# Patient Record
Sex: MALE | Race: WHITE | NOT HISPANIC OR LATINO | URBAN - METROPOLITAN AREA
[De-identification: names, ages, dates, MRNs, and addresses within clinical notes are randomized per-mention and may not be internally consistent; named-entity substitution may affect disease eponyms.]

---

## 2023-10-13 ENCOUNTER — PHARMACY VISIT (OUTPATIENT)
Dept: PHARMACY | Facility: CLINIC | Age: 48
End: 2023-10-13
Payer: MEDICAID

## 2023-10-13 DIAGNOSIS — J30.2 SEASONAL ALLERGIES: Primary | ICD-10-CM

## 2023-10-13 PROCEDURE — RXMED WILLOW AMBULATORY MEDICATION CHARGE

## 2023-10-16 ENCOUNTER — PHARMACY VISIT (OUTPATIENT)
Dept: PHARMACY | Facility: CLINIC | Age: 48
End: 2023-10-16
Payer: MEDICAID

## 2023-10-16 PROCEDURE — RXMED WILLOW AMBULATORY MEDICATION CHARGE

## 2023-10-16 RX ORDER — PSEUDOEPHEDRINE HYDROCHLORIDE 60 MG/1
60 TABLET ORAL 3 TIMES DAILY PRN
Qty: 50 TABLET | Refills: 3 | Status: SHIPPED | OUTPATIENT
Start: 2023-10-16 | End: 2024-04-30 | Stop reason: SDUPTHER

## 2023-11-14 ENCOUNTER — PHARMACY VISIT (OUTPATIENT)
Dept: PHARMACY | Facility: CLINIC | Age: 48
End: 2023-11-14
Payer: MEDICAID

## 2023-11-14 DIAGNOSIS — E55.9 VITAMIN D DEFICIENCY: ICD-10-CM

## 2023-11-14 DIAGNOSIS — F32.A DEPRESSION, UNSPECIFIED DEPRESSION TYPE: ICD-10-CM

## 2023-11-14 DIAGNOSIS — B20 HIV INFECTION, UNSPECIFIED SYMPTOM STATUS (MULTI): ICD-10-CM

## 2023-11-14 DIAGNOSIS — E78.2 MIXED HYPERLIPIDEMIA: ICD-10-CM

## 2023-11-14 PROCEDURE — RXMED WILLOW AMBULATORY MEDICATION CHARGE

## 2023-11-16 ENCOUNTER — PHARMACY VISIT (OUTPATIENT)
Dept: PHARMACY | Facility: CLINIC | Age: 48
End: 2023-11-16
Payer: MEDICAID

## 2023-11-16 PROCEDURE — RXMED WILLOW AMBULATORY MEDICATION CHARGE

## 2023-11-16 RX ORDER — CHOLECALCIFEROL (VITAMIN D3) 50 MCG
50 TABLET ORAL DAILY
Qty: 30 TABLET | Refills: 5 | Status: SHIPPED | OUTPATIENT
Start: 2023-11-16 | End: 2024-05-26 | Stop reason: SDUPTHER

## 2023-11-16 RX ORDER — ATORVASTATIN CALCIUM 20 MG/1
20 TABLET, FILM COATED ORAL DAILY
Qty: 30 TABLET | Refills: 5 | Status: SHIPPED | OUTPATIENT
Start: 2023-11-16 | End: 2024-05-26 | Stop reason: SDUPTHER

## 2023-11-16 RX ORDER — CITALOPRAM 40 MG/1
40 TABLET, FILM COATED ORAL DAILY
Qty: 30 TABLET | Refills: 5 | Status: SHIPPED | OUTPATIENT
Start: 2023-11-16 | End: 2024-05-26 | Stop reason: SDUPTHER

## 2023-11-16 RX ORDER — DARUNAVIR, COBICISTAT, EMTRICITABINE, AND TENOFOVIR ALAFENAMIDE 800; 150; 200; 10 MG/1; MG/1; MG/1; MG/1
1 TABLET, FILM COATED ORAL DAILY
Qty: 30 TABLET | Refills: 5 | Status: SHIPPED | OUTPATIENT
Start: 2023-11-16 | End: 2024-05-26 | Stop reason: SDUPTHER

## 2023-12-15 PROCEDURE — RXMED WILLOW AMBULATORY MEDICATION CHARGE

## 2023-12-19 ENCOUNTER — PHARMACY VISIT (OUTPATIENT)
Dept: PHARMACY | Facility: CLINIC | Age: 48
End: 2023-12-19
Payer: MEDICAID

## 2024-01-24 PROCEDURE — RXMED WILLOW AMBULATORY MEDICATION CHARGE

## 2024-01-26 ENCOUNTER — PHARMACY VISIT (OUTPATIENT)
Dept: PHARMACY | Facility: CLINIC | Age: 49
End: 2024-01-26
Payer: MEDICAID

## 2024-01-29 ENCOUNTER — PHARMACY VISIT (OUTPATIENT)
Dept: PHARMACY | Facility: CLINIC | Age: 49
End: 2024-01-29

## 2024-02-28 ENCOUNTER — PHARMACY VISIT (OUTPATIENT)
Dept: PHARMACY | Facility: CLINIC | Age: 49
End: 2024-02-28
Payer: MEDICAID

## 2024-02-28 PROCEDURE — RXMED WILLOW AMBULATORY MEDICATION CHARGE

## 2024-03-14 ENCOUNTER — OFFICE VISIT (OUTPATIENT)
Dept: IMMUNOLOGY | Facility: CLINIC | Age: 49
End: 2024-03-14
Payer: COMMERCIAL

## 2024-03-14 VITALS
HEART RATE: 92 BPM | DIASTOLIC BLOOD PRESSURE: 79 MMHG | SYSTOLIC BLOOD PRESSURE: 140 MMHG | OXYGEN SATURATION: 97 % | BODY MASS INDEX: 31.38 KG/M2 | WEIGHT: 225 LBS

## 2024-03-14 DIAGNOSIS — I10 HYPERTENSION, UNSPECIFIED TYPE: ICD-10-CM

## 2024-03-14 DIAGNOSIS — Z13.9 SCREENING DUE: ICD-10-CM

## 2024-03-14 DIAGNOSIS — Z23 NEED FOR VACCINATION: ICD-10-CM

## 2024-03-14 DIAGNOSIS — B20 HIV INFECTION, UNSPECIFIED SYMPTOM STATUS (MULTI): Primary | ICD-10-CM

## 2024-03-14 LAB
ALBUMIN SERPL BCP-MCNC: 4.3 G/DL (ref 3.4–5)
ALP SERPL-CCNC: 54 U/L (ref 33–120)
ALT SERPL W P-5'-P-CCNC: 21 U/L (ref 10–52)
ANION GAP SERPL CALC-SCNC: 14 MMOL/L (ref 10–20)
AST SERPL W P-5'-P-CCNC: 23 U/L (ref 9–39)
BASOPHILS # BLD AUTO: 0.09 X10*3/UL (ref 0–0.1)
BASOPHILS NFR BLD AUTO: 1.4 %
BILIRUB SERPL-MCNC: 0.6 MG/DL (ref 0–1.2)
BUN SERPL-MCNC: 17 MG/DL (ref 6–23)
CALCIUM SERPL-MCNC: 9.5 MG/DL (ref 8.6–10.6)
CHLORIDE SERPL-SCNC: 103 MMOL/L (ref 98–107)
CO2 SERPL-SCNC: 28 MMOL/L (ref 21–32)
CREAT SERPL-MCNC: 1.09 MG/DL (ref 0.5–1.3)
EGFRCR SERPLBLD CKD-EPI 2021: 84 ML/MIN/1.73M*2
EOSINOPHIL # BLD AUTO: 0.35 X10*3/UL (ref 0–0.7)
EOSINOPHIL NFR BLD AUTO: 5.4 %
ERYTHROCYTE [DISTWIDTH] IN BLOOD BY AUTOMATED COUNT: 13 % (ref 11.5–14.5)
GLUCOSE SERPL-MCNC: 59 MG/DL (ref 74–99)
HCT VFR BLD AUTO: 46 % (ref 41–52)
HGB BLD-MCNC: 14.7 G/DL (ref 13.5–17.5)
IMM GRANULOCYTES # BLD AUTO: 0.01 X10*3/UL (ref 0–0.7)
IMM GRANULOCYTES NFR BLD AUTO: 0.2 % (ref 0–0.9)
LYMPHOCYTES # BLD AUTO: 1.1 X10*3/UL (ref 1.2–4.8)
LYMPHOCYTES NFR BLD AUTO: 17.1 %
MCH RBC QN AUTO: 31.5 PG (ref 26–34)
MCHC RBC AUTO-ENTMCNC: 32 G/DL (ref 32–36)
MCV RBC AUTO: 99 FL (ref 80–100)
MONOCYTES # BLD AUTO: 0.89 X10*3/UL (ref 0.1–1)
MONOCYTES NFR BLD AUTO: 13.8 %
NEUTROPHILS # BLD AUTO: 3.99 X10*3/UL (ref 1.2–7.7)
NEUTROPHILS NFR BLD AUTO: 62.1 %
NRBC BLD-RTO: 0 /100 WBCS (ref 0–0)
PLATELET # BLD AUTO: 445 X10*3/UL (ref 150–450)
POTASSIUM SERPL-SCNC: 4.5 MMOL/L (ref 3.5–5.3)
PROT SERPL-MCNC: 7.3 G/DL (ref 6.4–8.2)
RBC # BLD AUTO: 4.67 X10*6/UL (ref 4.5–5.9)
SODIUM SERPL-SCNC: 140 MMOL/L (ref 136–145)
WBC # BLD AUTO: 6.4 X10*3/UL (ref 4.4–11.3)

## 2024-03-14 PROCEDURE — 3078F DIAST BP <80 MM HG: CPT | Performed by: INTERNAL MEDICINE

## 2024-03-14 PROCEDURE — 36415 COLL VENOUS BLD VENIPUNCTURE: CPT | Performed by: INTERNAL MEDICINE

## 2024-03-14 PROCEDURE — 90471 IMMUNIZATION ADMIN: CPT | Performed by: INTERNAL MEDICINE

## 2024-03-14 PROCEDURE — 87536 HIV-1 QUANT&REVRSE TRNSCRPJ: CPT | Performed by: INTERNAL MEDICINE

## 2024-03-14 PROCEDURE — 85025 COMPLETE CBC W/AUTO DIFF WBC: CPT | Performed by: INTERNAL MEDICINE

## 2024-03-14 PROCEDURE — 99214 OFFICE O/P EST MOD 30 MIN: CPT | Performed by: INTERNAL MEDICINE

## 2024-03-14 PROCEDURE — 88185 FLOWCYTOMETRY/TC ADD-ON: CPT | Mod: TC | Performed by: INTERNAL MEDICINE

## 2024-03-14 PROCEDURE — 4274F FLU IMMUNO ADMIND RCVD: CPT | Performed by: INTERNAL MEDICINE

## 2024-03-14 PROCEDURE — 3077F SYST BP >= 140 MM HG: CPT | Performed by: INTERNAL MEDICINE

## 2024-03-14 PROCEDURE — 80053 COMPREHEN METABOLIC PANEL: CPT | Performed by: INTERNAL MEDICINE

## 2024-03-14 ASSESSMENT — ENCOUNTER SYMPTOMS
ACTIVITY CHANGE: 0
ANAL BLEEDING: 0
DYSURIA: 0
FATIGUE: 0
FEVER: 0
COUGH: 0
WOUND: 0
HEADACHES: 0
RECTAL PAIN: 0
DIARRHEA: 0
PALPITATIONS: 0
VOMITING: 0
NAUSEA: 0
CHILLS: 0
CONSTIPATION: 0
ABDOMINAL DISTENTION: 0
CHOKING: 0
JOINT SWELLING: 0
RHINORRHEA: 0
CHEST TIGHTNESS: 0
BACK PAIN: 0
DIZZINESS: 0
APPETITE CHANGE: 0
BLOOD IN STOOL: 0
SORE THROAT: 0
SINUS PAIN: 0
ABDOMINAL PAIN: 0
HEMATURIA: 0
SINUS PRESSURE: 0
FLANK PAIN: 0

## 2024-03-14 NOTE — PROGRESS NOTES
HIV Clinic Follow-up Visit:    Royce Miller Jr. was last seen in [facility] on Visit date not found    Missed antiretroviral doses in last 72 hours? No    Sexually active? no      Tobacco use: No     SUBJECTIVE:     Here for follow-up.  On Symtuza with report of a few missed doses this month. He has been missing doses at the beginning of each month since he has been late to call to request shipment of his prescription. Currently not sexually active.   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 Has some frustrations but overall work is ok.   ROS negative for any rhinorrhea, sore throat or cough. No chest pain. Reports dyspnea on moderate exertion like running to catch the bus, not new for him and not worsening.  Denies any abdominal pain, N/V. No melena, hematochezia or hematuria. No dysuria.   No headaches or dizziness.  Needs replacements for his eyeglasses, has been straining to see well. Upcoming appointment is in April. Does not drive.      Review of Systems  Review of Systems   Constitutional:  Negative for activity change, appetite change, chills, fatigue and fever.   HENT:  Negative for congestion, dental problem, ear pain, mouth sores, rhinorrhea, sinus pressure, sinus pain, sneezing and sore throat.    Respiratory:  Negative for cough, choking and chest tightness.    Cardiovascular:  Negative for chest pain, palpitations and leg swelling.   Gastrointestinal:  Negative for abdominal distention, abdominal pain, anal bleeding, blood in stool, constipation, diarrhea, nausea, rectal pain and vomiting.   Genitourinary:  Negative for dysuria, flank pain, hematuria, penile discharge and penile pain.   Musculoskeletal:  Negative for back pain and joint swelling.   Skin:  Negative for rash and wound.   Neurological:  Negative for dizziness, syncope and headaches.       CURRENT MEDICATIONS:    Current Outpatient Medications:     atorvastatin (Lipitor) 20 mg tablet, TAKE 1 TABLET BY MOUTH EVERY DAY, Disp: 30 tablet, Rfl:  5    cholecalciferol (Vitamin D-3) 50 MCG (2000 UT) tablet, TAKE 1 TABLET BY MOUTH ONCE DAILY, Disp: 30 tablet, Rfl: 5    citalopram (CeleXA) 40 mg tablet, TAKE ONE TABLET BY MOUTH EVERY DAY, Disp: 30 tablet, Rfl: 5    darunavir-bette-emtri-tenof ala (Symtuza) [id number] mg tablet, TAKE 1 TABLET BY MOUTH ONCE DAILY WITH FOOD, Disp: 30 tablet, Rfl: 5    ibuprofen 400 mg tablet, TAKE 1 TABLET BY MOUTH THREE TIMES A DAY AS NEEDED, Disp: 90 tablet, Rfl: 3    pseudoephedrine (Sudogest) 60 mg tablet, TAKE 1 TABLET BY MOUTH THREE TIMES A DAY AS NEEDED, Disp: 50 tablet, Rfl: 3    PHYSICAL EXAMINATION:  Visit Vitals  /79 (BP Location: Left arm, Patient Position: Sitting, BP Cuff Size: Adult)   Pulse 92   Wt 102 kg (225 lb)   SpO2 97%   BMI 31.38 kg/m²   BSA 2.26 m²       Physical Exam     General: In NAD. Conversant and pleasant  HEENT: Atraumatic, normocephalic, no sinus tenderness, moist mucous membranes, gingival inflammation over lower mandible with plaque noted, no oropharyngeal erythema or lesions. No palpable lymphadenopathy  Heart: S1S2, RRR, no murmur heard  Lungs: GBAE, clear to auscultation   Abdomen:+BS, soft, non-tender, no guarding or rebound   PVS:+peripheral pulses, no LE edema or erythema       PERTINENT DATA:  CBC:  WBC   Date Value Ref Range Status   02/02/2023 9.4 4.4 - 11.3 x10E9/L Final     nRBC   Date Value Ref Range Status   02/02/2023 0.0 0.0 - 0.0 /100 WBC Final     RBC   Date Value Ref Range Status   02/02/2023 4.64 4.50 - 5.90 x10E12/L Final     Hemoglobin   Date Value Ref Range Status   02/02/2023 12.9 (L) 13.5 - 17.5 g/dL Final     MCV   Date Value Ref Range Status   02/02/2023 89 80 - 100 fL Final     RDW   Date Value Ref Range Status   02/02/2023 15.5 (H) 11.5 - 14.5 % Final       Renal Function Panel:  Glucose   Date Value Ref Range Status   02/02/2023 75 74 - 99 mg/dL Final     Sodium   Date Value Ref Range Status   02/02/2023 136 136 - 145 mmol/L Final     Potassium   Date Value  "Ref Range Status   02/02/2023 4.5 3.5 - 5.3 mmol/L Final     Chloride   Date Value Ref Range Status   02/02/2023 98 98 - 107 mmol/L Final     Anion Gap   Date Value Ref Range Status   02/02/2023 12 10 - 20 mmol/L Final     Urea Nitrogen   Date Value Ref Range Status   02/02/2023 17 6 - 23 mg/dL Final     Creatinine   Date Value Ref Range Status   02/02/2023 1.12 0.50 - 1.30 mg/dL Final     Calcium   Date Value Ref Range Status   02/02/2023 9.7 8.6 - 10.6 mg/dL Final     Phosphorus   Date Value Ref Range Status   02/22/2021 3.1 2.5 - 4.9 mg/dL Final     Comment:      The performance characteristics of phosphorus testing in   heparinized plasma have been validated by the individual     laboratory site where testing is performed. Testing    on heparinized plasma is not approved by the FDA;    however, such approval is not necessary.       Albumin   Date Value Ref Range Status   02/02/2023 4.6 3.4 - 5.0 g/dL Final       Hepatic Panel:  Albumin   Date Value Ref Range Status   02/02/2023 4.6 3.4 - 5.0 g/dL Final     Total Bilirubin   Date Value Ref Range Status   02/02/2023 0.8 0.0 - 1.2 mg/dL Final     Bilirubin, Direct   Date Value Ref Range Status   10/19/2020 0.1 0.0 - 0.3 mg/dL Final     Alkaline Phosphatase   Date Value Ref Range Status   02/02/2023 60 33 - 120 U/L Final     ALT (SGPT)   Date Value Ref Range Status   02/02/2023 15 10 - 52 U/L Final     Comment:      Patients treated with Sulfasalazine may generate    falsely decreased results for ALT.         HIV Viral Load:  No results found for: \"[id number]\", \"HIVRNAPCR\"    CD4 Count:  CD3+CD4+%   Date Value Ref Range Status   02/02/2023 50 29 - 57 % Final     CD3+CD4+ Absolute   Date Value Ref Range Status   02/02/2023 0.640 0.350 - 2.740 x10E9/L Final     CD3+CD8+%   Date Value Ref Range Status   02/02/2023 37 (H) 7 - 31 % Final     CD3+CD8+ Absolute   Date Value Ref Range Status   02/02/2023 0.474 0.080 - 1.490 x10E9/L Final     CD4/CD8 Ratio   Date Value Ref " "Range Status   02/02/2023 1.35 1.00 - 3.50 Final     CD45%   Date Value Ref Range Status   02/02/2023 100 % Final       CRCL:  No results found for: \"URINEVOLUME\", \"CREATU\", \"[id number]\", \"CRCLEARANCE\"    Lipid Panel:  HDL   Date Value Ref Range Status   02/02/2023 73.6 mg/dL Final     Comment:     .      AGE      VERY LOW   LOW     NORMAL    HIGH       0-19 Y       < 35   < 40     40-45     ----    20-24 Y       ----   < 40       >45     ----      >24 Y       ----   < 40     40-60      >60  .       Cholesterol/HDL Ratio   Date Value Ref Range Status   02/02/2023 1.9  Final     Comment:     REF VALUES  DESIRABLE  < 3.4  HIGH RISK  > 5.0       LDL   Date Value Ref Range Status   02/02/2023 40 0 - 99 mg/dL Final     Comment:     .                           NEAR      BORD      AGE      DESIRABLE  OPTIMAL    HIGH     HIGH     VERY HIGH     0-19 Y     0 - 109     ---    110-129   >/= 130     ----    20-24 Y     0 - 119     ---    120-159   >/= 160     ----      >24 Y     0 -  99   100-129  130-159   160-189     >/=190  .       VLDL   Date Value Ref Range Status   02/02/2023 26 0 - 40 mg/dL Final     Triglycerides   Date Value Ref Range Status   02/02/2023 131 0 - 149 mg/dL Final     Comment:     .      AGE      DESIRABLE   BORDERLINE HIGH   HIGH     VERY HIGH   0 D-90 D    19 - 174         ----         ----        ----  91 D- 9 Y     0 -  74        75 -  99     >/= 100      ----    10-19 Y     0 -  89        90 - 129     >/= 130      ----    20-24 Y     0 - 114       115 - 149     >/= 150      ----         >24 Y     0 - 149       150 - 199    200- 499    >/= 500  .   Venipuncture immediately after or during the    administration of Metamizole may lead to falsely   low results. Testing should be performed immediately   prior to Metamizole dosing.         HgbA1c:  No results found for: \"HGBA1C\"    The ASCVD Risk score (Elise DK, et al., 2019) failed to calculate for the following reasons:    The smoking status is " invalid    ASSESSMENT / PLAN:    #HIV    -Well controlled  -Most recent CD4 count of 640 and VL undetectable on 23  -On Symtuza with occasional missed doses at the beginning of every month since he forgets to call in to have the medication shipped to his place.  -Discussed setting a reminder a week ahead of time to avoid missing doses and the importance of not missing any doses. He agreed that it sounded reasonable and he will try that.  -Currently not sexually active   -Will get routine labs today (CBC, CMP, CD4, VL)    #Anemia  Iron deficiency anemia  On PO Iron   Due for screening colonoscopy. Will order     #Depression  -On Citalopram   -Mood is stable.   No suicidal ideations.  Discussed mental health services here. He mentions it doesn't work with his schedule. Informed of capability of doing it over  the phone if needed.    #Obesity   Gained around 20 lbs since last visit.  Has not been exercising since his job has been physically demanding on him   Has been eating at home mostly.  Discussed importance of weight control and physical activity, also as an adjunct to help control BP    #HTN   BP in clinic today 140/79  Discussed measuring BP at home and noting it, so we can review at next visit.  Will prescribe BP cuff     #Health maintenance:    -Flu shot today, Otherwise UTD on  immunizations  -No need for STI screening today since currently not sexually active   -Most recent lipid panel 23: CT:140, HDL:73.6, LDL:40, T. On Atorvastatin 20mg.  Not fasting today. Will repeat lipid panel on next visit   -Age 48. Due for screening colonoscopy. Will order  -Physical exam with evidence of gingival inflammation over lower mandible. Has not had any recent dental follow-up. Discussed flossing, mouthwash and need for regular dental follow-ups. Will give him [facility] contact numbers to schedule appointment. He also has a dental clinic near his residence that he can also go to.     RTC 6 months                  Problem List Items Addressed This Visit    None      Nayan Higgins MD

## 2024-03-15 LAB
CD3+CD4+ CELLS # BLD: 0.55 X10E9/L
CD3+CD4+ CELLS # BLD: 550 /MM3
CD3+CD4+ CELLS NFR BLD: 50 %
CD3+CD4+ CELLS/CD3+CD8+ CLL BLD: 1.39 %
CD3+CD8+ CELLS # BLD: 0.4 X10E9/L
CD3+CD8+ CELLS NFR BLD: 36 %
LYMPHOCYTES # SPEC AUTO: 1.1 X10*3/UL

## 2024-03-16 LAB
HIV1 RNA # PLAS NAA DL=20: NOT DETECTED {COPIES}/ML
HIV1 RNA SPEC NAA+PROBE-LOG#: NORMAL {LOG_COPIES}/ML

## 2024-03-26 PROCEDURE — RXMED WILLOW AMBULATORY MEDICATION CHARGE

## 2024-03-28 ENCOUNTER — PHARMACY VISIT (OUTPATIENT)
Dept: PHARMACY | Facility: CLINIC | Age: 49
End: 2024-03-28
Payer: MEDICAID

## 2024-04-30 DIAGNOSIS — J30.2 SEASONAL ALLERGIES: ICD-10-CM

## 2024-04-30 DIAGNOSIS — R51.9 NONINTRACTABLE HEADACHE, UNSPECIFIED CHRONICITY PATTERN, UNSPECIFIED HEADACHE TYPE: Primary | ICD-10-CM

## 2024-04-30 PROCEDURE — RXMED WILLOW AMBULATORY MEDICATION CHARGE

## 2024-05-01 PROCEDURE — RXMED WILLOW AMBULATORY MEDICATION CHARGE

## 2024-05-01 RX ORDER — PSEUDOEPHEDRINE HYDROCHLORIDE 60 MG/1
60 TABLET ORAL 3 TIMES DAILY PRN
Qty: 50 TABLET | Refills: 3 | Status: SHIPPED | OUTPATIENT
Start: 2024-05-01 | End: 2025-05-01

## 2024-05-01 RX ORDER — IBUPROFEN 400 MG/1
400 TABLET ORAL 3 TIMES DAILY PRN
Qty: 90 TABLET | Refills: 3 | Status: SHIPPED | OUTPATIENT
Start: 2024-05-01 | End: 2025-05-01

## 2024-05-03 ENCOUNTER — PHARMACY VISIT (OUTPATIENT)
Dept: PHARMACY | Facility: CLINIC | Age: 49
End: 2024-05-03
Payer: MEDICAID

## 2024-05-06 ENCOUNTER — PHARMACY VISIT (OUTPATIENT)
Dept: PHARMACY | Facility: CLINIC | Age: 49
End: 2024-05-06
Payer: MEDICAID

## 2024-05-22 ENCOUNTER — LAB (OUTPATIENT)
Dept: LAB | Facility: LAB | Age: 49
End: 2024-05-22
Payer: COMMERCIAL

## 2024-05-22 DIAGNOSIS — Z00.6 RESEARCH STUDY PATIENT: ICD-10-CM

## 2024-05-22 DIAGNOSIS — Z00.6 RESEARCH EXAM: ICD-10-CM

## 2024-05-22 DIAGNOSIS — Z00.6 RESEARCH STUDY PATIENT: Primary | ICD-10-CM

## 2024-05-22 LAB — SARS-COV-2 IGG SERPLBLD QL IA.RAPID: NEGATIVE

## 2024-05-22 PROCEDURE — 36415 COLL VENOUS BLD VENIPUNCTURE: CPT

## 2024-05-22 PROCEDURE — 86790 VIRUS ANTIBODY NOS: CPT

## 2024-05-23 ENCOUNTER — LAB (OUTPATIENT)
Dept: LAB | Facility: LAB | Age: 49
End: 2024-05-23
Payer: COMMERCIAL

## 2024-05-23 DIAGNOSIS — Z00.6 RESEARCH EXAM: ICD-10-CM

## 2024-05-23 PROCEDURE — 86790 VIRUS ANTIBODY NOS: CPT

## 2024-05-23 PROCEDURE — 36415 COLL VENOUS BLD VENIPUNCTURE: CPT

## 2024-05-26 DIAGNOSIS — E55.9 VITAMIN D DEFICIENCY: ICD-10-CM

## 2024-05-26 DIAGNOSIS — E78.2 MIXED HYPERLIPIDEMIA: ICD-10-CM

## 2024-05-26 DIAGNOSIS — F32.A DEPRESSION, UNSPECIFIED DEPRESSION TYPE: ICD-10-CM

## 2024-05-26 DIAGNOSIS — B20 HIV INFECTION, UNSPECIFIED SYMPTOM STATUS (MULTI): ICD-10-CM

## 2024-05-26 LAB — SARS-COV-2 IGG SERPLBLD QL IA.RAPID: NEGATIVE

## 2024-05-28 PROCEDURE — RXMED WILLOW AMBULATORY MEDICATION CHARGE

## 2024-05-28 RX ORDER — ATORVASTATIN CALCIUM 20 MG/1
20 TABLET, FILM COATED ORAL DAILY
Qty: 30 TABLET | Refills: 0 | Status: SHIPPED | OUTPATIENT
Start: 2024-05-28

## 2024-05-28 RX ORDER — CHOLECALCIFEROL (VITAMIN D3) 50 MCG
50 TABLET ORAL DAILY
Qty: 30 TABLET | Refills: 0 | Status: SHIPPED | OUTPATIENT
Start: 2024-05-28

## 2024-05-28 RX ORDER — DARUNAVIR, COBICISTAT, EMTRICITABINE, AND TENOFOVIR ALAFENAMIDE 800; 150; 200; 10 MG/1; MG/1; MG/1; MG/1
1 TABLET, FILM COATED ORAL DAILY
Qty: 30 TABLET | Refills: 0 | Status: SHIPPED | OUTPATIENT
Start: 2024-05-28

## 2024-05-28 RX ORDER — CITALOPRAM 40 MG/1
40 TABLET, FILM COATED ORAL DAILY
Qty: 30 TABLET | Refills: 0 | Status: SHIPPED | OUTPATIENT
Start: 2024-05-28

## 2024-05-30 ENCOUNTER — PHARMACY VISIT (OUTPATIENT)
Dept: PHARMACY | Facility: CLINIC | Age: 49
End: 2024-05-30
Payer: MEDICAID

## 2024-06-25 DIAGNOSIS — E78.2 MIXED HYPERLIPIDEMIA: ICD-10-CM

## 2024-06-25 DIAGNOSIS — F32.A DEPRESSION, UNSPECIFIED DEPRESSION TYPE: ICD-10-CM

## 2024-06-25 DIAGNOSIS — B20 HIV INFECTION, UNSPECIFIED SYMPTOM STATUS (MULTI): ICD-10-CM

## 2024-06-25 DIAGNOSIS — E55.9 VITAMIN D DEFICIENCY: ICD-10-CM

## 2024-06-25 PROCEDURE — RXMED WILLOW AMBULATORY MEDICATION CHARGE

## 2024-06-25 RX ORDER — DARUNAVIR, COBICISTAT, EMTRICITABINE, AND TENOFOVIR ALAFENAMIDE 800; 150; 200; 10 MG/1; MG/1; MG/1; MG/1
1 TABLET, FILM COATED ORAL DAILY
Qty: 30 TABLET | Refills: 3 | Status: SHIPPED | OUTPATIENT
Start: 2024-06-25

## 2024-06-25 RX ORDER — CHOLECALCIFEROL (VITAMIN D3) 50 MCG
50 TABLET ORAL DAILY
Qty: 30 TABLET | Refills: 3 | Status: SHIPPED | OUTPATIENT
Start: 2024-06-25

## 2024-06-25 RX ORDER — CITALOPRAM 40 MG/1
40 TABLET, FILM COATED ORAL DAILY
Qty: 30 TABLET | Refills: 3 | Status: SHIPPED | OUTPATIENT
Start: 2024-06-25

## 2024-06-25 RX ORDER — ATORVASTATIN CALCIUM 20 MG/1
20 TABLET, FILM COATED ORAL DAILY
Qty: 30 TABLET | Refills: 3 | Status: SHIPPED | OUTPATIENT
Start: 2024-06-25

## 2024-06-28 ENCOUNTER — PHARMACY VISIT (OUTPATIENT)
Dept: PHARMACY | Facility: CLINIC | Age: 49
End: 2024-06-28
Payer: MEDICAID

## 2024-07-25 PROCEDURE — RXMED WILLOW AMBULATORY MEDICATION CHARGE

## 2024-07-26 ENCOUNTER — PHARMACY VISIT (OUTPATIENT)
Dept: PHARMACY | Facility: CLINIC | Age: 49
End: 2024-07-26
Payer: MEDICAID

## 2024-08-12 PROCEDURE — RXMED WILLOW AMBULATORY MEDICATION CHARGE

## 2024-08-15 ENCOUNTER — PHARMACY VISIT (OUTPATIENT)
Dept: PHARMACY | Facility: CLINIC | Age: 49
End: 2024-08-15
Payer: MEDICAID

## 2024-08-21 PROCEDURE — RXMED WILLOW AMBULATORY MEDICATION CHARGE

## 2024-08-23 ENCOUNTER — PHARMACY VISIT (OUTPATIENT)
Dept: PHARMACY | Facility: CLINIC | Age: 49
End: 2024-08-23
Payer: MEDICAID

## 2024-09-12 ENCOUNTER — APPOINTMENT (OUTPATIENT)
Dept: IMMUNOLOGY | Facility: CLINIC | Age: 49
End: 2024-09-12
Payer: COMMERCIAL

## 2024-09-12 DIAGNOSIS — Z79.899 HIGH RISK MEDICATIONS (NOT ANTICOAGULANTS) LONG-TERM USE: ICD-10-CM

## 2024-09-12 DIAGNOSIS — Z11.3 ROUTINE SCREENING FOR STI (SEXUALLY TRANSMITTED INFECTION): Primary | ICD-10-CM

## 2024-09-12 DIAGNOSIS — B20 AIDS (ACQUIRED IMMUNE DEFICIENCY SYNDROME) (MULTI): ICD-10-CM

## 2024-09-12 PROCEDURE — 87536 HIV-1 QUANT&REVRSE TRNSCRPJ: CPT | Performed by: INTERNAL MEDICINE

## 2024-09-12 PROCEDURE — 87491 CHLMYD TRACH DNA AMP PROBE: CPT | Performed by: INTERNAL MEDICINE

## 2024-09-12 PROCEDURE — 88185 FLOWCYTOMETRY/TC ADD-ON: CPT | Performed by: INTERNAL MEDICINE

## 2024-09-19 PROCEDURE — RXMED WILLOW AMBULATORY MEDICATION CHARGE

## 2024-09-21 ENCOUNTER — PHARMACY VISIT (OUTPATIENT)
Dept: PHARMACY | Facility: CLINIC | Age: 49
End: 2024-09-21
Payer: MEDICAID

## 2024-10-11 PROCEDURE — RXMED WILLOW AMBULATORY MEDICATION CHARGE

## 2024-10-14 ENCOUNTER — PHARMACY VISIT (OUTPATIENT)
Dept: PHARMACY | Facility: CLINIC | Age: 49
End: 2024-10-14
Payer: MEDICAID

## 2024-10-17 DIAGNOSIS — B20 HIV DISEASE (MULTI): Primary | ICD-10-CM

## 2024-10-17 DIAGNOSIS — Z21 HIV INFECTION, UNSPECIFIED SYMPTOM STATUS: ICD-10-CM

## 2024-10-17 DIAGNOSIS — F32.A DEPRESSION, UNSPECIFIED DEPRESSION TYPE: ICD-10-CM

## 2024-10-17 DIAGNOSIS — E78.2 MIXED HYPERLIPIDEMIA: ICD-10-CM

## 2024-10-17 DIAGNOSIS — E55.9 VITAMIN D DEFICIENCY: ICD-10-CM

## 2024-10-17 PROCEDURE — RXMED WILLOW AMBULATORY MEDICATION CHARGE

## 2024-10-17 RX ORDER — CHOLECALCIFEROL (VITAMIN D3) 50 MCG
50 TABLET ORAL DAILY
Qty: 30 TABLET | Refills: 5 | Status: SHIPPED | OUTPATIENT
Start: 2024-10-17

## 2024-10-17 RX ORDER — CITALOPRAM 40 MG/1
40 TABLET, FILM COATED ORAL DAILY
Qty: 30 TABLET | Refills: 5 | Status: SHIPPED | OUTPATIENT
Start: 2024-10-17

## 2024-10-17 RX ORDER — DARUNAVIR, COBICISTAT, EMTRICITABINE, AND TENOFOVIR ALAFENAMIDE 800; 150; 200; 10 MG/1; MG/1; MG/1; MG/1
1 TABLET, FILM COATED ORAL DAILY
Qty: 30 TABLET | Refills: 5 | Status: SHIPPED | OUTPATIENT
Start: 2024-10-17

## 2024-10-17 RX ORDER — ATORVASTATIN CALCIUM 20 MG/1
20 TABLET, FILM COATED ORAL DAILY
Qty: 30 TABLET | Refills: 5 | Status: SHIPPED | OUTPATIENT
Start: 2024-10-17

## 2024-10-22 ENCOUNTER — PHARMACY VISIT (OUTPATIENT)
Dept: PHARMACY | Facility: CLINIC | Age: 49
End: 2024-10-22
Payer: MEDICAID

## 2024-10-22 DIAGNOSIS — J30.2 SEASONAL ALLERGIES: ICD-10-CM

## 2024-10-24 PROCEDURE — RXMED WILLOW AMBULATORY MEDICATION CHARGE

## 2024-10-24 RX ORDER — PSEUDOEPHEDRINE HYDROCHLORIDE 60 MG/1
60 TABLET ORAL 3 TIMES DAILY PRN
Qty: 50 TABLET | Refills: 3 | Status: SHIPPED | OUTPATIENT
Start: 2024-10-24 | End: 2025-10-24

## 2024-10-28 ENCOUNTER — PHARMACY VISIT (OUTPATIENT)
Dept: PHARMACY | Facility: CLINIC | Age: 49
End: 2024-10-28
Payer: MEDICAID

## 2024-10-31 ENCOUNTER — OFFICE VISIT (OUTPATIENT)
Dept: IMMUNOLOGY | Facility: CLINIC | Age: 49
End: 2024-10-31
Payer: COMMERCIAL

## 2024-10-31 VITALS
OXYGEN SATURATION: 96 % | BODY MASS INDEX: 33.95 KG/M2 | WEIGHT: 243.4 LBS | TEMPERATURE: 96.4 F | SYSTOLIC BLOOD PRESSURE: 140 MMHG | DIASTOLIC BLOOD PRESSURE: 88 MMHG | HEART RATE: 80 BPM | RESPIRATION RATE: 16 BRPM

## 2024-10-31 DIAGNOSIS — B20 AIDS (ACQUIRED IMMUNE DEFICIENCY SYNDROME) (MULTI): Primary | ICD-10-CM

## 2024-10-31 DIAGNOSIS — Z23 NEED FOR VACCINATION: ICD-10-CM

## 2024-10-31 LAB
ALBUMIN SERPL BCP-MCNC: 4.8 G/DL (ref 3.4–5)
ALP SERPL-CCNC: 58 U/L (ref 33–120)
ALT SERPL W P-5'-P-CCNC: 24 U/L (ref 10–52)
ANION GAP SERPL CALC-SCNC: 14 MMOL/L (ref 10–20)
AST SERPL W P-5'-P-CCNC: 23 U/L (ref 9–39)
BASOPHILS # BLD AUTO: 0.11 X10*3/UL (ref 0–0.1)
BASOPHILS NFR BLD AUTO: 1.3 %
BILIRUB DIRECT SERPL-MCNC: 0.1 MG/DL (ref 0–0.3)
BILIRUB SERPL-MCNC: 0.6 MG/DL (ref 0–1.2)
BUN SERPL-MCNC: 17 MG/DL (ref 6–23)
CALCIUM SERPL-MCNC: 9 MG/DL (ref 8.6–10.6)
CHLORIDE SERPL-SCNC: 101 MMOL/L (ref 98–107)
CO2 SERPL-SCNC: 29 MMOL/L (ref 21–32)
CREAT SERPL-MCNC: 1 MG/DL (ref 0.5–1.3)
EGFRCR SERPLBLD CKD-EPI 2021: >90 ML/MIN/1.73M*2
EOSINOPHIL # BLD AUTO: 0.23 X10*3/UL (ref 0–0.7)
EOSINOPHIL NFR BLD AUTO: 2.8 %
ERYTHROCYTE [DISTWIDTH] IN BLOOD BY AUTOMATED COUNT: 13.4 % (ref 11.5–14.5)
GLUCOSE SERPL-MCNC: 68 MG/DL (ref 74–99)
HCT VFR BLD AUTO: 45.9 % (ref 41–52)
HGB BLD-MCNC: 14.6 G/DL (ref 13.5–17.5)
IMM GRANULOCYTES # BLD AUTO: 0.02 X10*3/UL (ref 0–0.7)
IMM GRANULOCYTES NFR BLD AUTO: 0.2 % (ref 0–0.9)
LYMPHOCYTES # BLD AUTO: 1.4 X10*3/UL (ref 1.2–4.8)
LYMPHOCYTES NFR BLD AUTO: 16.8 %
MCH RBC QN AUTO: 30.5 PG (ref 26–34)
MCHC RBC AUTO-ENTMCNC: 31.8 G/DL (ref 32–36)
MCV RBC AUTO: 96 FL (ref 80–100)
MONOCYTES # BLD AUTO: 1.06 X10*3/UL (ref 0.1–1)
MONOCYTES NFR BLD AUTO: 12.7 %
NEUTROPHILS # BLD AUTO: 5.5 X10*3/UL (ref 1.2–7.7)
NEUTROPHILS NFR BLD AUTO: 66.2 %
NRBC BLD-RTO: 0 /100 WBCS (ref 0–0)
PHOSPHATE SERPL-MCNC: 3.4 MG/DL (ref 2.5–4.9)
PLATELET # BLD AUTO: 458 X10*3/UL (ref 150–450)
POTASSIUM SERPL-SCNC: 4.4 MMOL/L (ref 3.5–5.3)
PROT SERPL-MCNC: 8 G/DL (ref 6.4–8.2)
RBC # BLD AUTO: 4.79 X10*6/UL (ref 4.5–5.9)
SODIUM SERPL-SCNC: 140 MMOL/L (ref 136–145)
WBC # BLD AUTO: 8.3 X10*3/UL (ref 4.4–11.3)

## 2024-10-31 PROCEDURE — 82248 BILIRUBIN DIRECT: CPT | Performed by: INTERNAL MEDICINE

## 2024-10-31 PROCEDURE — 4274F FLU IMMUNO ADMIND RCVD: CPT | Performed by: INTERNAL MEDICINE

## 2024-10-31 PROCEDURE — 36415 COLL VENOUS BLD VENIPUNCTURE: CPT | Performed by: INTERNAL MEDICINE

## 2024-10-31 PROCEDURE — 84100 ASSAY OF PHOSPHORUS: CPT | Performed by: INTERNAL MEDICINE

## 2024-10-31 PROCEDURE — 90656 IIV3 VACC NO PRSV 0.5 ML IM: CPT | Performed by: INTERNAL MEDICINE

## 2024-10-31 PROCEDURE — 86318 IA INFECTIOUS AGENT ANTIBODY: CPT | Performed by: INTERNAL MEDICINE

## 2024-10-31 PROCEDURE — 85025 COMPLETE CBC W/AUTO DIFF WBC: CPT | Performed by: INTERNAL MEDICINE

## 2024-10-31 PROCEDURE — 99214 OFFICE O/P EST MOD 30 MIN: CPT | Performed by: INTERNAL MEDICINE

## 2024-10-31 ASSESSMENT — PAIN SCALES - GENERAL: PAINLEVEL_OUTOF10: 2

## 2024-10-31 NOTE — PROGRESS NOTES
HIV Clinic Follow-up Visit:    Royce Miller Jr. was last seen in [facility] on 3/14/24    Missed antiretroviral doses in last 72 hours? No    Sexually active? no, > yearPartner/s aware of diagnosis? NA,     Condom use?  NA , Partner on PrEP? NA    Tobacco use: No   Alcohol: 3 drinks of rum and coke/night    SUBJECTIVE:   Here for follow-up.  Working a lot of hours at [employer].  Now full time and .  Has not yet used his dental insurance.   Drinking daily but much less than past.  Not sexually active.    Denies SOB, cough, chest pain.  No GI or  symptoms    Review of Systems  Full 10 point ROS performed.  All pertinent positives and negatives as documented in HPI.      CURRENT MEDICATIONS:    Current Outpatient Medications:     atorvastatin (Lipitor) 20 mg tablet, TAKE 1 TABLET BY MOUTH EVERY DAY, Disp: 30 tablet, Rfl: 5    cholecalciferol (Vitamin D-3) 50 MCG (2000 UT) tablet, TAKE 1 TABLET BY MOUTH ONCE DAILY, Disp: 30 tablet, Rfl: 5    citalopram (CeleXA) 40 mg tablet, TAKE ONE TABLET BY MOUTH EVERY DAY, Disp: 30 tablet, Rfl: 5    ibuprofen 400 mg tablet, TAKE 1 TABLET BY MOUTH THREE TIMES A DAY AS NEEDED, Disp: 90 tablet, Rfl: 3    miscellaneous medical supply misc, Monitor Blood pressure once to twice daily and write down result so can check it at next visit, Disp: 1 each, Rfl: 0    pseudoephedrine (Sudogest) 60 mg tablet, TAKE 1 TABLET BY MOUTH THREE TIMES A DAY AS NEEDED, Disp: 50 tablet, Rfl: 3    Symtuza [id number] mg tablet, Take 1 tablet by mouth once daily. With food, Disp: 30 tablet, Rfl: 5    PHYSICAL EXAMINATION:  Visit Vitals  /88   Pulse 80   Temp 35.8 °C (96.4 °F)   Resp 16   Wt 110 kg (243 lb 6.4 oz)   SpO2 96%   BMI 33.95 kg/m²   BSA 2.35 m²       Physical Exam   Physical Exam  Vitals reviewed.   Constitutional:       Appearance: Normal appearance. He is normal weight.   HENT:      Head: Normocephalic and atraumatic.      Mouth/Throat:      Mouth: Mucous membranes are  moist.      Pharynx: Oropharynx is clear.   Eyes:      Extraocular Movements: Extraocular movements intact.      Conjunctiva/sclera: Conjunctivae normal.      Pupils: Pupils are equal, round, and reactive to light.   Cardiovascular:      Rate and Rhythm: Normal rate and regular rhythm.      Heart sounds: Normal heart sounds.   Pulmonary:      Effort: Pulmonary effort is normal.      Breath sounds: Normal breath sounds.   Abdominal:      General: Abdomen is flat. Bowel sounds are normal.      Palpations: Abdomen is soft.   Musculoskeletal:      Cervical back: Normal range of motion and neck supple.   Neurological:      General: No focal deficit present.      Mental Status: He is alert and oriented to person, place, and time.      Cranial Nerves: No cranial nerve deficit.         PERTINENT DATA:  CBC:  WBC   Date Value Ref Range Status   10/31/2024 8.3 4.4 - 11.3 x10*3/uL Final     nRBC   Date Value Ref Range Status   10/31/2024 0.0 0.0 - 0.0 /100 WBCs Final     RBC   Date Value Ref Range Status   10/31/2024 4.79 4.50 - 5.90 x10*6/uL Final     Hemoglobin   Date Value Ref Range Status   10/31/2024 14.6 13.5 - 17.5 g/dL Final     MCV   Date Value Ref Range Status   10/31/2024 96 80 - 100 fL Final     RDW   Date Value Ref Range Status   10/31/2024 13.4 11.5 - 14.5 % Final       Renal Function Panel:  Glucose   Date Value Ref Range Status   10/31/2024 68 (L) 74 - 99 mg/dL Final     Sodium   Date Value Ref Range Status   10/31/2024 140 136 - 145 mmol/L Final     Potassium   Date Value Ref Range Status   10/31/2024 4.4 3.5 - 5.3 mmol/L Final     Chloride   Date Value Ref Range Status   10/31/2024 101 98 - 107 mmol/L Final     Anion Gap   Date Value Ref Range Status   10/31/2024 14 10 - 20 mmol/L Final     Urea Nitrogen   Date Value Ref Range Status   10/31/2024 17 6 - 23 mg/dL Final     Creatinine   Date Value Ref Range Status   10/31/2024 1.00 0.50 - 1.30 mg/dL Final     eGFR   Date Value Ref Range Status   10/31/2024 >90  >60 mL/min/1.73m*2 Final     Comment:     Calculations of estimated GFR are performed using the 2021 CKD-EPI Study Refit equation without the race variable for the IDMS-Traceable creatinine methods.  https://jasn.asnjournals.org/content/early/2021/09/22/ASN.3706765552     Calcium   Date Value Ref Range Status   10/31/2024 9.0 8.6 - 10.6 mg/dL Final     Phosphorus   Date Value Ref Range Status   10/31/2024 3.4 2.5 - 4.9 mg/dL Final     Comment:     The performance characteristics of phosphorus testing in heparinized plasma have been validated by the individual  laboratory site where testing is performed. Testing on heparinized plasma is not approved by the FDA; however, such approval is not necessary.     Albumin   Date Value Ref Range Status   10/31/2024 4.8 3.4 - 5.0 g/dL Final       Hepatic Panel:  Albumin   Date Value Ref Range Status   10/31/2024 4.8 3.4 - 5.0 g/dL Final     Bilirubin, Total   Date Value Ref Range Status   10/31/2024 0.6 0.0 - 1.2 mg/dL Final     Bilirubin, Direct   Date Value Ref Range Status   10/31/2024 0.1 0.0 - 0.3 mg/dL Final     Alkaline Phosphatase   Date Value Ref Range Status   10/31/2024 58 33 - 120 U/L Final     ALT   Date Value Ref Range Status   10/31/2024 24 10 - 52 U/L Final     Comment:     Patients treated with Sulfasalazine may generate falsely decreased results for ALT.       HIV Viral Load:  HIV-1 RNA PCR Viral Load Log   Date Value Ref Range Status   10/31/2024   Final     Comment:     Not calculated     HIV RNA Result   Date Value Ref Range Status   10/31/2024 Not Detected Not Detected Final       CD4 Count:  CD3+CD4+%   Date Value Ref Range Status   10/31/2024 44 29 - 57 % Final     CD3+CD4+ Absolute   Date Value Ref Range Status   10/31/2024 0.616 0.350 - 2.740 x10E9/L Final     CD3+CD8+%   Date Value Ref Range Status   10/31/2024 37 (H) 7 - 31 % Final     CD3+CD8+ Absolute   Date Value Ref Range Status   10/31/2024 0.518 0.080 - 1.490 x10E9/L Final     CD4/CD8 Ratio  "  Date Value Ref Range Status   10/31/2024 1.19 1.00 - 2.60 Final     CD45%   Date Value Ref Range Status   02/02/2023 100 % Final       CRCL:  No results found for: \"URINEVOLUME\", \"CREATU\", \"[id number]\", \"CRCLEARANCE\"    Lipid Panel:  HDL   Date Value Ref Range Status   02/02/2023 73.6 mg/dL Final     Comment:     .      AGE      VERY LOW   LOW     NORMAL    HIGH       0-19 Y       < 35   < 40     40-45     ----    20-24 Y       ----   < 40       >45     ----      >24 Y       ----   < 40     40-60      >60  .       Cholesterol/HDL Ratio   Date Value Ref Range Status   02/02/2023 1.9  Final     Comment:     REF VALUES  DESIRABLE  < 3.4  HIGH RISK  > 5.0       LDL   Date Value Ref Range Status   02/02/2023 40 0 - 99 mg/dL Final     Comment:     .                           NEAR      BORD      AGE      DESIRABLE  OPTIMAL    HIGH     HIGH     VERY HIGH     0-19 Y     0 - 109     ---    110-129   >/= 130     ----    20-24 Y     0 - 119     ---    120-159   >/= 160     ----      >24 Y     0 -  99   100-129  130-159   160-189     >/=190  .       VLDL   Date Value Ref Range Status   02/02/2023 26 0 - 40 mg/dL Final     Triglycerides   Date Value Ref Range Status   02/02/2023 131 0 - 149 mg/dL Final     Comment:     .      AGE      DESIRABLE   BORDERLINE HIGH   HIGH     VERY HIGH   0 D-90 D    19 - 174         ----         ----        ----  91 D- 9 Y     0 -  74        75 -  99     >/= 100      ----    10-19 Y     0 -  89        90 - 129     >/= 130      ----    20-24 Y     0 - 114       115 - 149     >/= 150      ----         >24 Y     0 - 149       150 - 199    200- 499    >/= 500  .   Venipuncture immediately after or during the    administration of Metamizole may lead to falsely   low results. Testing should be performed immediately   prior to Metamizole dosing.         HgbA1c:  No results found for: \"HGBA1C\"    The ASCVD Risk score (Elise DK, et al., 2019) failed to calculate for the following reasons:    The smoking " status is invalid    ASSESSMENT / PLAN:    Problem List Items Addressed This Visit       AIDS (acquired immune deficiency syndrome) (Multi) - Primary    Overview     HIV history entered by HIV care provider: PLEASE DO NOT DELETE     Initial diagnosis:  5/1/04  CD4 jamey of 114 on  5/19/04.    Pertinent Screens:  HAV Ab:  Reactive  2/22/21  HBsAg:  Non-reactive  5/3/21  HBsAb:  335.0 6/25/21  (post-vax)  HBcAb:    HCV Ab:  Negative  5/1/04   CMV IgG:  Positive,  13.2,  5/1/04  Toxo IgG:  Negative  5/1/04  G6PD:  Normal  5/4/04  HLA B57-01:  Negative  1/22/18  Syphilis IgG:  Positive 6/19/14;  FTA (+) 6/18/09 (Had neurosyphilis 5/1/04)  PPD/IGRA:  Negative PPD  4/30/18  HIV Genotype:  5/4/04  Major Mutation Hx:     HAART Hx:  FTC + TDF + EBENEZER/r: 7/19/04 - 9/29/04;  TVD EBENEZER/r: 9/29/04 - 12/16/19;  Symtuza (STZ): 12/16/19 - current    HIV associated illnesses/Jaswant:  Neurosyphilis (ocular syphilis w/ panuveitis/retinitis) at dx  5/4/04  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  We will continue Symtuza daily  Routine labs will be collected today: CBC w/ diff, Hepatic panel, renal panel, CD4 and HIV RNA PCR.  Vaccines updated with Flu shot today.  STD screening not performed as not indicated.  Will RTC in 6 months            Other Visit Diagnoses       Need for vaccination        Relevant Orders    Flu vaccine, trivalent, preservative free, age 6 months and greater (Fluarix/Fluzone/Flulaval) (Completed)            Mary Grace Moore MD

## 2024-11-01 LAB
C TRACH RRNA SPEC QL NAA+PROBE: NEGATIVE
HIV1 RNA # PLAS NAA DL=20: NOT DETECTED {COPIES}/ML
HIV1 RNA SPEC NAA+PROBE-LOG#: NORMAL {LOG_COPIES}/ML
N GONORRHOEA DNA SPEC QL PROBE+SIG AMP: NEGATIVE
RPR SER QL: REACTIVE
RPR SER-TITR: ABNORMAL {TITER}

## 2024-11-03 LAB
CD3+CD4+ CELLS # BLD: 0.62 X10E9/L
CD3+CD4+ CELLS # BLD: 616 /MM3
CD3+CD4+ CELLS NFR BLD: 44 %
CD3+CD4+ CELLS/CD3+CD8+ CLL BLD: 1.19 %
CD3+CD8+ CELLS # BLD: 0.52 X10E9/L
CD3+CD8+ CELLS NFR BLD: 37 %
LYMPHOCYTES # SPEC AUTO: 1.4 X10*3/UL

## 2024-11-07 NOTE — ASSESSMENT & PLAN NOTE
Immunologically intact and virologically suppressed on current anti-retroviral agents.  We will continue Symtuza daily  Routine labs will be collected today: CBC w/ diff, Hepatic panel, renal panel, CD4 and HIV RNA PCR.  Vaccines updated with Flu shot today.  STD screening not performed as not indicated.  Will RTC in 6 months

## 2024-12-02 PROCEDURE — RXMED WILLOW AMBULATORY MEDICATION CHARGE

## 2024-12-04 ENCOUNTER — PHARMACY VISIT (OUTPATIENT)
Dept: PHARMACY | Facility: CLINIC | Age: 49
End: 2024-12-04
Payer: MEDICAID

## 2024-12-23 PROCEDURE — RXMED WILLOW AMBULATORY MEDICATION CHARGE

## 2024-12-26 ENCOUNTER — PHARMACY VISIT (OUTPATIENT)
Dept: PHARMACY | Facility: CLINIC | Age: 49
End: 2024-12-26
Payer: MEDICAID

## 2024-12-27 PROCEDURE — RXMED WILLOW AMBULATORY MEDICATION CHARGE

## 2024-12-30 ENCOUNTER — PHARMACY VISIT (OUTPATIENT)
Dept: PHARMACY | Facility: CLINIC | Age: 49
End: 2024-12-30
Payer: MEDICAID

## 2025-01-18 DIAGNOSIS — R51.9 NONINTRACTABLE HEADACHE, UNSPECIFIED CHRONICITY PATTERN, UNSPECIFIED HEADACHE TYPE: ICD-10-CM

## 2025-01-20 RX ORDER — IBUPROFEN 400 MG/1
400 TABLET ORAL 3 TIMES DAILY PRN
Qty: 90 TABLET | Refills: 3 | Status: SHIPPED | OUTPATIENT
Start: 2025-01-20

## 2025-02-05 ENCOUNTER — DOCUMENTATION (OUTPATIENT)
Dept: IMMUNOLOGY | Facility: CLINIC | Age: 50
End: 2025-02-05
Payer: COMMERCIAL

## 2025-02-05 NOTE — PROGRESS NOTES
Geno calls Pt to check in after RN forwards VM that Pt lost his Medicaid. Sw leaves  for Pt. Geno also sends the following email. Will continue to follow assist Pt with medication access and obtaining health coverage (if eligible).     From: Rere Mcneill  Sent: Wednesday, February 5, 2025 11:57 AM  To: [email or web address] <[email or web address]  Subject: OHDAP     Stanley Crane,    I hope you're doing well! I left you a voicemail a little while ago. Elizabeth forwarded me a message that you lost your Medicaid and are trying to get your meds. I'm happy to help you apply for O[employer]AP - can you send me your most recent, consecutive pay stubs? I need two if you're paid biweekly or bimonthly, and four if you're paid weekly. Once I have an idea what your income is I can help you either reapply for Medicaid or possibly a Marketplace plan if you qualify for a special enrollment (OHDAP will pay those premiums for you). Do you know when your Medicaid terminated?     Talk soon,      Rere “PHILIPP” ARY Mcneill  Pronouns: they/them (what is this?)   III  [facility]  P: [phone]  F: [phone]  [email or web address]

## 2025-03-03 ENCOUNTER — TELEPHONE (OUTPATIENT)
Dept: IMMUNOLOGY | Facility: CLINIC | Age: 50
End: 2025-03-03
Payer: COMMERCIAL

## 2025-03-03 NOTE — TELEPHONE ENCOUNTER
From: Rere Mcneill <[email or web address]>  Sent: 2025 2:38 PM  To: Royce <[email or web address]>  Subject: Re: ID     Royce Ohara - this ID  in . I just need something to prove your residency since your pay stubs don't have your address on them. Do you have a utility bill or anything from the last 30 days?      Rere “PHILIPP” ARY Mcneill    Pronouns: they/them (what is this?)  P: [phone]  F: [phone]        From: Royce <[email or web address]>  Sent: 2025 2:17 PM  To: Rere Mcneill <[email or web address]>  Subject: Fw: ID    From: Royce <[email or web address]>  Sent: 2025 5:01 PM  To: Rere Mcneill <[email or web address]>  Subject: Re: Scans       The Id I can get it when Mukesh downstairs if home from work.    I no  longer have a job.  Drama got to much.    On 2025 at 02:23:52 PM Osito MCCABE Mary K [email or web address]> wrote:      Stanley Crane,    I'm working on  your [employer] application now. Do you have an updated photo ID that you can take a photo of and email to me quick?      Rere “PHILIPP” ARY Mcneill  Pronouns: they/them (what is this?)  P: [phone]  F: [phone]

## 2025-03-21 ENCOUNTER — TELEPHONE (OUTPATIENT)
Dept: IMMUNOLOGY | Facility: CLINIC | Age: 50
End: 2025-03-21
Payer: COMMERCIAL

## 2025-03-21 NOTE — TELEPHONE ENCOUNTER
From: Rere Mcneill JM <[email or web address]>  Sent: Friday, March 21, 2025 12:43 PM  To: Royce <[email or web address]>  Cc: Nat Wilks <[email or web address]>  Subject: Re: W2     Stanley Crane,    I got your email - thank you! Your OHDAP was approved. We need to [facility] - is there a particular [facility] that you want [facility] to [facility] them to? You should be able to get your meds tomorrow. I've attached your OHDAP ID to this email.    You can reply-all to this email to tell Elizabeth about your pharmacy. ??      Rere “PHILIPP” ARY Mcneill  Pronouns: they/them (what is this?)  P: [phone]  F: [phone]      From: Royce <[email or web address]>  Sent: Friday, March 21, 2025 12:18 AM  To: Rere Mcneill <[email or web address]>  Subject: Fw: W2     Notice - This message is from an external sender  This message came from outside of . Careful opening links or attachments.  Report Suspicious         ----- Forwarded Message -----  From: Mukesh Cuevas <[email or web address]>  To: [email or web address] <[email or web address]>  Sent: Sunday, March 16, 2025 at 01:05:23 PM EDT  Subject: W2      From: Rere Mcneill <[email or web address]>  Sent: Wednesday, March 19, 2025 10:59 AM  To: Royce <[email or web address]>  Cc: Nat Wilks <[email or web address]>  Subject: Re: Royce Crane,    I did not! I've been having issues with both my voicemail and my email. Will you resend it? Hopefully we can have OHDAP for you today. If you reply-all Elizabeth will get it to and can forward it to me just in case!    Unrelated to your OHDAP right now, but have you applied for Medicaid at benefits.[place].gov? If not, go ahead and do that. At the end of the application you'll have the option to save a copy - if you save it, please send that to me, too. ??    Thanks,    Rere “MJ” ARY Mcneill  Pronouns: they/them (what is  "this?)  P: [phone]  F: [phone]      From: Royce <[email or web address]>  Sent: Wednesday, March 19, 2025 12:01 AM  To: Rere Mcneill <[email or web address]>  Subject: Royce Miller        did you get my email for the w2...     I first tried to contact in middle of Jan.. now late march.    From: Rere Mcneill <[email or web address]>  Sent: Thursday, March 13, 2025 4:18 PM  To: Royce <[email or web address]>  Cc: Nat Wilks <[email or web address]>  Subject: Re: ID     Stanley Crane,    I tried submitting your OHDAP application with that residency attestation and it was denied with the following note: \"attestations are not accepted on the new yearly renewals.\" I'm sorry that this an inconvenience for you, but I do need a W-2, utility bill, lease, or official government mail that shows your current residency that's dated from the last 45 days.  Unfortunately, the person who usually approves emergency access fills is out of town - I'm waiting to hear from another person who may be able to give me that permission.    Thanks,      Rere “PHILIPP” ARY Mcneill    Pronouns: they/them (what is this?)  P: [phone]  F: [phone]      From: Rere Mcneill <[email or web address]>  Sent: Thursday, March 13, 2025 9:14 AM  To: Royce <[email or web address]>  Subject: Re: АЛЕКСНАДР Crane,    If you can get that W2 to me that's perfect. I'm sorry that you're out of meds! I let Elizabeth know, too. I'm going to submit your OHDAP with a residency attestation and see if they will approve it for you - if not, I will see if we can do an emergency fill through [facility] through your other [facility] enrollment. I'll get back to you later today, or as soon as I hear from [facility] re: your application.     When was your last dose of meds that you took?    Rere “PHILIPP” ARY Mcneill  Pronouns: they/them (what is this?)  P: [phone]  F: [phone]      From: Royce" <[email or web address]>  Sent: Monday, March 10, 2025 10:55 AM  To: Rere Mcneill <[email or web address]>  Subject: Re: ID     W2 I can get..    i am out of meds

## 2025-03-24 DIAGNOSIS — J30.2 SEASONAL ALLERGIES: ICD-10-CM

## 2025-03-24 DIAGNOSIS — E55.9 VITAMIN D DEFICIENCY: ICD-10-CM

## 2025-03-24 DIAGNOSIS — F32.A DEPRESSION, UNSPECIFIED DEPRESSION TYPE: ICD-10-CM

## 2025-03-24 DIAGNOSIS — B20 HIV DISEASE (MULTI): ICD-10-CM

## 2025-03-24 DIAGNOSIS — E78.2 MIXED HYPERLIPIDEMIA: ICD-10-CM

## 2025-03-24 RX ORDER — ATORVASTATIN CALCIUM 20 MG/1
20 TABLET, FILM COATED ORAL DAILY
Qty: 30 TABLET | Refills: 5 | Status: SHIPPED | OUTPATIENT
Start: 2025-03-24

## 2025-03-24 RX ORDER — CITALOPRAM 40 MG/1
40 TABLET, FILM COATED ORAL DAILY
Qty: 30 TABLET | Refills: 5 | Status: SHIPPED | OUTPATIENT
Start: 2025-03-24

## 2025-03-24 RX ORDER — DARUNAVIR, COBICISTAT, EMTRICITABINE, AND TENOFOVIR ALAFENAMIDE 800; 150; 200; 10 MG/1; MG/1; MG/1; MG/1
1 TABLET, FILM COATED ORAL DAILY
Qty: 30 TABLET | Refills: 5 | Status: SHIPPED | OUTPATIENT
Start: 2025-03-24

## 2025-03-24 RX ORDER — CHOLECALCIFEROL (VITAMIN D3) 50 MCG
50 TABLET ORAL DAILY
Qty: 30 TABLET | Refills: 5 | Status: SHIPPED | OUTPATIENT
Start: 2025-03-24

## 2025-03-24 RX ORDER — PSEUDOEPHEDRINE HYDROCHLORIDE 60 MG/1
60 TABLET ORAL 3 TIMES DAILY PRN
Qty: 50 TABLET | Refills: 3 | OUTPATIENT
Start: 2025-03-24 | End: 2026-03-24

## 2025-05-08 ENCOUNTER — DOCUMENTATION (OUTPATIENT)
Dept: IMMUNOLOGY | Facility: CLINIC | Age: 50
End: 2025-05-08

## 2025-05-08 ENCOUNTER — OFFICE VISIT (OUTPATIENT)
Dept: IMMUNOLOGY | Facility: CLINIC | Age: 50
End: 2025-05-08

## 2025-05-08 VITALS
DIASTOLIC BLOOD PRESSURE: 86 MMHG | HEART RATE: 87 BPM | SYSTOLIC BLOOD PRESSURE: 146 MMHG | RESPIRATION RATE: 21 BRPM | OXYGEN SATURATION: 92 % | WEIGHT: 237.5 LBS | BODY MASS INDEX: 33.12 KG/M2

## 2025-05-08 DIAGNOSIS — Z79.899 HIGH RISK MEDICATION USE: ICD-10-CM

## 2025-05-08 DIAGNOSIS — Z00.00 HEALTH CARE MAINTENANCE: ICD-10-CM

## 2025-05-08 DIAGNOSIS — B20 AIDS (ACQUIRED IMMUNE DEFICIENCY SYNDROME) (MULTI): Primary | ICD-10-CM

## 2025-05-08 DIAGNOSIS — E55.9 VITAMIN D DEFICIENCY: ICD-10-CM

## 2025-05-08 DIAGNOSIS — E78.2 MIXED HYPERLIPIDEMIA: ICD-10-CM

## 2025-05-08 DIAGNOSIS — I10 HTN (HYPERTENSION) WITH GOAL TO BE DETERMINED: ICD-10-CM

## 2025-05-08 DIAGNOSIS — J30.2 SEASONAL ALLERGIES: ICD-10-CM

## 2025-05-08 PROBLEM — A53.9 SYPHILIS: Status: ACTIVE | Noted: 2025-05-08

## 2025-05-08 PROBLEM — Q63.1 HORSESHOE KIDNEY: Status: RESOLVED | Noted: 2025-05-08 | Resolved: 2025-05-08

## 2025-05-08 PROBLEM — E66.9 OBESITY: Status: ACTIVE | Noted: 2025-05-08

## 2025-05-08 PROBLEM — E78.00 HYPERCHOLESTEROLEMIA: Status: ACTIVE | Noted: 2025-05-08

## 2025-05-08 PROBLEM — F32.A DEPRESSIVE DISORDER: Status: ACTIVE | Noted: 2025-05-08

## 2025-05-08 PROBLEM — Q63.1 HORSESHOE KIDNEY: Status: ACTIVE | Noted: 2025-05-08

## 2025-05-08 LAB
ALBUMIN SERPL BCP-MCNC: 4.1 G/DL (ref 3.4–5)
ALP SERPL-CCNC: 60 U/L (ref 33–120)
ALT SERPL W P-5'-P-CCNC: 20 U/L (ref 10–52)
ANION GAP SERPL CALC-SCNC: 13 MMOL/L (ref 10–20)
AST SERPL W P-5'-P-CCNC: 24 U/L (ref 9–39)
BILIRUB SERPL-MCNC: 0.3 MG/DL (ref 0–1.2)
BUN SERPL-MCNC: 13 MG/DL (ref 6–23)
CALCIUM SERPL-MCNC: 8.9 MG/DL (ref 8.6–10.6)
CHLORIDE SERPL-SCNC: 107 MMOL/L (ref 98–107)
CHOLEST SERPL-MCNC: 145 MG/DL (ref 0–199)
CHOLESTEROL/HDL RATIO: 2.4
CO2 SERPL-SCNC: 26 MMOL/L (ref 21–32)
CREAT SERPL-MCNC: 0.85 MG/DL (ref 0.5–1.3)
EGFRCR SERPLBLD CKD-EPI 2021: >90 ML/MIN/1.73M*2
GLUCOSE SERPL-MCNC: 77 MG/DL (ref 74–99)
HDLC SERPL-MCNC: 59.8 MG/DL
LDLC SERPL CALC-MCNC: 58 MG/DL
NON HDL CHOLESTEROL: 85 MG/DL (ref 0–149)
PHOSPHATE SERPL-MCNC: 3.4 MG/DL (ref 2.5–4.9)
POTASSIUM SERPL-SCNC: 4.3 MMOL/L (ref 3.5–5.3)
PROT SERPL-MCNC: 7.4 G/DL (ref 6.4–8.2)
SODIUM SERPL-SCNC: 142 MMOL/L (ref 136–145)
TRIGL SERPL-MCNC: 137 MG/DL (ref 0–149)
VLDL: 27 MG/DL (ref 0–40)

## 2025-05-08 PROCEDURE — 36415 COLL VENOUS BLD VENIPUNCTURE: CPT | Performed by: INTERNAL MEDICINE

## 2025-05-08 PROCEDURE — 90677 PCV20 VACCINE IM: CPT | Performed by: INTERNAL MEDICINE

## 2025-05-08 PROCEDURE — 3079F DIAST BP 80-89 MM HG: CPT | Performed by: INTERNAL MEDICINE

## 2025-05-08 PROCEDURE — RXMED WILLOW AMBULATORY MEDICATION CHARGE

## 2025-05-08 PROCEDURE — 99215 OFFICE O/P EST HI 40 MIN: CPT | Performed by: INTERNAL MEDICINE

## 2025-05-08 PROCEDURE — 82306 VITAMIN D 25 HYDROXY: CPT | Performed by: INTERNAL MEDICINE

## 2025-05-08 PROCEDURE — 3077F SYST BP >= 140 MM HG: CPT | Performed by: INTERNAL MEDICINE

## 2025-05-08 PROCEDURE — 85025 COMPLETE CBC W/AUTO DIFF WBC: CPT | Performed by: INTERNAL MEDICINE

## 2025-05-08 PROCEDURE — 83036 HEMOGLOBIN GLYCOSYLATED A1C: CPT | Performed by: INTERNAL MEDICINE

## 2025-05-08 PROCEDURE — 90471 IMMUNIZATION ADMIN: CPT | Performed by: INTERNAL MEDICINE

## 2025-05-08 PROCEDURE — 80053 COMPREHEN METABOLIC PANEL: CPT | Performed by: INTERNAL MEDICINE

## 2025-05-08 PROCEDURE — 87536 HIV-1 QUANT&REVRSE TRNSCRPJ: CPT | Performed by: INTERNAL MEDICINE

## 2025-05-08 PROCEDURE — 88185 FLOWCYTOMETRY/TC ADD-ON: CPT | Performed by: INTERNAL MEDICINE

## 2025-05-08 PROCEDURE — 99215 OFFICE O/P EST HI 40 MIN: CPT | Mod: 25 | Performed by: INTERNAL MEDICINE

## 2025-05-08 PROCEDURE — 1036F TOBACCO NON-USER: CPT | Performed by: INTERNAL MEDICINE

## 2025-05-08 PROCEDURE — 84100 ASSAY OF PHOSPHORUS: CPT | Performed by: INTERNAL MEDICINE

## 2025-05-08 PROCEDURE — 80061 LIPID PANEL: CPT | Performed by: INTERNAL MEDICINE

## 2025-05-08 RX ORDER — LEVOCETIRIZINE DIHYDROCHLORIDE 5 MG/1
5 TABLET, FILM COATED ORAL DAILY
Qty: 90 TABLET | Refills: 1 | Status: SHIPPED | OUTPATIENT
Start: 2025-05-08 | End: 2025-11-04

## 2025-05-08 ASSESSMENT — PAIN SCALES - GENERAL: PAINLEVEL_OUTOF10: 0-NO PAIN

## 2025-05-08 NOTE — ASSESSMENT & PLAN NOTE
He was given instructions on monitoring BP at home at pharmacy. Instructed to call in numbers to assess if medications needed.Will have dietician asses.

## 2025-05-08 NOTE — PROGRESS NOTES
Geno meets with Pt at MD appt. Pt reports he is doing okay - still looking for a new job. Pt advises that he received notice that his Medicaid was approved - Sw will update RWAD. Pt requests transportation assistance to get home from today's visit. Geno provides Pt with one ADS/D bus ticket to meet this need. Pt will continue to contact Geno if any issues or barriers come up. Geno will continue to follow PRN.

## 2025-05-08 NOTE — ASSESSMENT & PLAN NOTE
Immunologically intact and virologically suppressed on current anti-retroviral agents.  Will continue Symtuza  Routine labs will be collected today: CBC w/ diff, Comprehensive metabolic panel, phosphorus, lipids, CD4 and HIV RNA PCR.  Vaccines updated: Prevnar 20 and TDaP administered  STD screening not performed as not sexually active.  Will RTC in 6 months

## 2025-05-08 NOTE — PATIENT INSTRUCTIONS
It was great to see you today!  Your last blood work that we did in clinic showed that your T-cells (CD4 count) was 616/44%.  Your virus was fully suppressed at less than 20 copies.  Keep up the great work taking your medications.  We collected routine blood work today.  I will see you back in clinic in 6 months.  If any issues should arise before then, please remember to call the clinic and get in contact with your nurse.    Today you received Prevnar 20, a pneumonia vaccine.  You will not require any further vaccines for pneumonia at this time, based on current recommendations.    Today you received your TdaP vaccine.  This is the vaccine for tetanus, diphtheria, and whooping cough.  This vaccine should be given every 10 years for basic prevention of tetanus.  If you would have a potential tetanus exposure and it has been greater than 5 years from your vaccine, you would receive a booster shot to prevent tetanus.  If you are going to be in the company of a  or infant, you will need a booster if it has been greater than 2 years to prevent you from potentially exposing the baby to Pertussis (whooping cough).    I would like for you to check your blood pressure outside of the clinic to see how your blood pressure is when you are at home. Sometimes just coming to the doctor's office can make a person's blood pressure higher.    If you do not have access to a blood pressure cuff in your home, you can go to a pharmacy near your home  Blood pressure monitors are usually found near the Pharmacy by the prescription counters.    Sit quietly for 3-5 minutes before measuring your blood pressure (do not watch TV, listen to music or use your phone).   Be sure not to drink caffeinated beverages or smoke before you go.    Check your blood pressure and write down the numbers for blood pressure and heart rate.    Our goal is to see the top number less than 140 and the bottom number less than 85.    Call your nurse with the  results of the readings after you have measured a few times.    If your numbers are higher than the goal, continue to check your blood pressure 1-2 times / week at the same pharmacy.     We may have to start or adjust medications and will have our dietician contact you to see if your diet may be causing some trouble for your blood pressure too.

## 2025-05-08 NOTE — PROGRESS NOTES
HIV Clinic Follow-up Visit:    Royce Miller Jr. was last seen in [facility] on 3/21/2025    Missed antiretroviral doses in last 72 hours? No    Sexually active? no, Partner/s aware of diagnosis? NA,     Condom use? NA, Partner on PrEP? NA    Tobacco use:  None   Alcohol: Fifth or Rum/week    SUBJECTIVE:   No ER or UC visits  Working at [employer] 2-3 days/week. Still in training.  Allergies acting up.  Denies daily use of Pseudo-phed   Denies need for food at home. Has food stamps. Does not run out  Only exercise is walking to bus-stop and stores.  Weight is stable overall. Down 5 pounds since October.    Review of Systems  Full 10 point ROS performed.  All pertinent positives and negatives as documented in HPI.      CURRENT MEDICATIONS:  Current Medications[1]    PHYSICAL EXAMINATION:  Visit Vitals  /86 (BP Location: Left arm, Patient Position: Sitting)   Pulse 87   Resp 21   Wt 108 kg (237 lb 8 oz)   SpO2 92%   BMI 33.12 kg/m²   Smoking Status Never   BSA 2.33 m²       Physical Exam   Physical Exam  Vitals reviewed.   Constitutional:       General: He is not in acute distress.     Appearance: Normal appearance. He is not ill-appearing.   HENT:      Head: Normocephalic and atraumatic.      Mouth/Throat:      Mouth: Mucous membranes are moist.      Pharynx: Oropharynx is clear. No posterior oropharyngeal erythema.   Eyes:      General: No scleral icterus.     Extraocular Movements: Extraocular movements intact.      Conjunctiva/sclera: Conjunctivae normal.      Pupils: Pupils are equal, round, and reactive to light.   Cardiovascular:      Rate and Rhythm: Normal rate and regular rhythm.      Pulses: Normal pulses.      Heart sounds: Normal heart sounds. No murmur heard.     No friction rub. No gallop.   Pulmonary:      Effort: Pulmonary effort is normal.      Breath sounds: Normal breath sounds. No wheezing, rhonchi or rales.   Abdominal:      General: Bowel sounds are normal.      Palpations: Abdomen is soft.  There is no mass.      Tenderness: There is no abdominal tenderness. There is no guarding or rebound.   Musculoskeletal:         General: No swelling or tenderness. Normal range of motion.      Cervical back: Normal range of motion and neck supple.   Lymphadenopathy:      Cervical: No cervical adenopathy.   Skin:     General: Skin is warm.      Findings: No erythema or rash.   Neurological:      General: No focal deficit present.      Mental Status: He is alert and oriented to person, place, and time.      Cranial Nerves: No cranial nerve deficit.      Motor: No weakness.      Deep Tendon Reflexes: Reflexes normal.   Psychiatric:         Mood and Affect: Mood normal.         Thought Content: Thought content normal.         PERTINENT DATA:  CBC:  WBC   Date Value Ref Range Status   10/31/2024 8.3 4.4 - 11.3 x10*3/uL Final     nRBC   Date Value Ref Range Status   10/31/2024 0.0 0.0 - 0.0 /100 WBCs Final     RBC   Date Value Ref Range Status   10/31/2024 4.79 4.50 - 5.90 x10*6/uL Final     Hemoglobin   Date Value Ref Range Status   10/31/2024 14.6 13.5 - 17.5 g/dL Final     MCV   Date Value Ref Range Status   10/31/2024 96 80 - 100 fL Final     RDW   Date Value Ref Range Status   10/31/2024 13.4 11.5 - 14.5 % Final       Renal Function Panel:  Glucose   Date Value Ref Range Status   10/31/2024 68 (L) 74 - 99 mg/dL Final     Sodium   Date Value Ref Range Status   10/31/2024 140 136 - 145 mmol/L Final     Potassium   Date Value Ref Range Status   10/31/2024 4.4 3.5 - 5.3 mmol/L Final     Chloride   Date Value Ref Range Status   10/31/2024 101 98 - 107 mmol/L Final     Anion Gap   Date Value Ref Range Status   10/31/2024 14 10 - 20 mmol/L Final     Urea Nitrogen   Date Value Ref Range Status   10/31/2024 17 6 - 23 mg/dL Final     Creatinine   Date Value Ref Range Status   10/31/2024 1.00 0.50 - 1.30 mg/dL Final     eGFR   Date Value Ref Range Status   10/31/2024 >90 >60 mL/min/1.73m*2 Final     Comment:     Calculations  of estimated GFR are performed using the 2021 CKD-EPI Study Refit equation without the race variable for the IDMS-Traceable creatinine methods.  https://jasn.asnjournals.org/content/early/2021/09/22/ASN.6930374521     Calcium   Date Value Ref Range Status   10/31/2024 9.0 8.6 - 10.6 mg/dL Final     Phosphorus   Date Value Ref Range Status   10/31/2024 3.4 2.5 - 4.9 mg/dL Final     Comment:     The performance characteristics of phosphorus testing in heparinized plasma have been validated by the individual  laboratory site where testing is performed. Testing on heparinized plasma is not approved by the FDA; however, such approval is not necessary.     Albumin   Date Value Ref Range Status   10/31/2024 4.8 3.4 - 5.0 g/dL Final       Hepatic Panel:  Albumin   Date Value Ref Range Status   10/31/2024 4.8 3.4 - 5.0 g/dL Final     Bilirubin, Total   Date Value Ref Range Status   10/31/2024 0.6 0.0 - 1.2 mg/dL Final     Bilirubin, Direct   Date Value Ref Range Status   10/31/2024 0.1 0.0 - 0.3 mg/dL Final     Alkaline Phosphatase   Date Value Ref Range Status   10/31/2024 58 33 - 120 U/L Final     ALT   Date Value Ref Range Status   10/31/2024 24 10 - 52 U/L Final     Comment:     Patients treated with Sulfasalazine may generate falsely decreased results for ALT.       HIV Viral Load:  HIV-1 RNA PCR Viral Load Log   Date Value Ref Range Status   10/31/2024   Final     Comment:     Not calculated     HIV RNA Result   Date Value Ref Range Status   10/31/2024 Not Detected Not Detected Final       CD4 Count:  CD3+CD4+%   Date Value Ref Range Status   10/31/2024 44 29 - 57 % Final     CD3+CD4+ Absolute   Date Value Ref Range Status   10/31/2024 0.616 0.350 - 2.740 x10E9/L Final     CD3+CD8+%   Date Value Ref Range Status   10/31/2024 37 (H) 7 - 31 % Final     CD3+CD8+ Absolute   Date Value Ref Range Status   10/31/2024 0.518 0.080 - 1.490 x10E9/L Final     CD4/CD8 Ratio   Date Value Ref Range Status   10/31/2024 1.19 1.00 -  2.60 Final     CD45%   Date Value Ref Range Status   02/02/2023 100 % Final       Lipid Panel:  HDL   Date Value Ref Range Status   02/02/2023 73.6 mg/dL Final     Comment:     .      AGE      VERY LOW   LOW     NORMAL    HIGH       0-19 Y       < 35   < 40     40-45     ----    20-24 Y       ----   < 40       >45     ----      >24 Y       ----   < 40     40-60      >60  .       Cholesterol/HDL Ratio   Date Value Ref Range Status   02/02/2023 1.9  Final     Comment:     REF VALUES  DESIRABLE  < 3.4  HIGH RISK  > 5.0       LDL   Date Value Ref Range Status   02/02/2023 40 0 - 99 mg/dL Final     Comment:     .                           NEAR      BORD      AGE      DESIRABLE  OPTIMAL    HIGH     HIGH     VERY HIGH     0-19 Y     0 - 109     ---    110-129   >/= 130     ----    20-24 Y     0 - 119     ---    120-159   >/= 160     ----      >24 Y     0 -  99   100-129  130-159   160-189     >/=190  .       VLDL   Date Value Ref Range Status   02/02/2023 26 0 - 40 mg/dL Final     Triglycerides   Date Value Ref Range Status   02/02/2023 131 0 - 149 mg/dL Final     Comment:     .      AGE      DESIRABLE   BORDERLINE HIGH   HIGH     VERY HIGH   0 D-90 D    19 - 174         ----         ----        ----  91 D- 9 Y     0 -  74        75 -  99     >/= 100      ----    10-19 Y     0 -  89        90 - 129     >/= 130      ----    20-24 Y     0 - 114       115 - 149     >/= 150      ----         >24 Y     0 - 149       150 - 199    200- 499    >/= 500  .   Venipuncture immediately after or during the    administration of Metamizole may lead to falsely   low results. Testing should be performed immediately   prior to Metamizole dosing.           The 10-year ASCVD risk score (Elise DK, et al., 2019) is: 1.3%    Values used to calculate the score:      Age: 49 years      Sex: Male      Is Non- : No      Diabetic: No      Tobacco smoker: No      Systolic Blood Pressure: 146 mmHg      Is BP treated: No      HDL  Cholesterol: 73.6 mg/dL      Total Cholesterol: 140 mg/dL    ASSESSMENT / PLAN:    Problem List Items Addressed This Visit       AIDS (acquired immune deficiency syndrome) (Multi) - Primary    Overview   HIV history entered by HIV care provider: PLEASE DO NOT DELETE     Initial diagnosis:  5/1/04  CD4 jamey of 114 on  5/19/04.    Pertinent Screens:  HAV Ab:  Reactive  2/22/21  HBsAg:  Non-reactive  5/3/21  HBsAb:  335.0 6/25/21  (post-vax)  HBcAb:    HCV Ab:  Negative  5/1/04   CMV IgG:  Positive,  13.2,  5/1/04  Toxo IgG:  Negative  5/1/04  G6PD:  Normal  5/4/04  HLA B57-01:  Negative  1/22/18  Syphilis IgG:  Positive 6/19/14;  FTA (+) 6/18/09 (Had neurosyphilis 5/1/04)  PPD/IGRA:  Negative PPD  4/30/18  HIV Genotype:  5/4/04  Major Mutation Hx:     HAART Hx:  FTC + TDF + EBENEZER/r: 7/19/04 - 9/29/04;  TVD EBENEZER/r: 9/29/04 - 12/16/19;  Symtuza (STZ): 12/16/19 - current    HIV associated illnesses/Jaswant:  Neurosyphilis (ocular syphilis w/ panuveitis/retinitis) at dx  5/4/04  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  Will continue Symtuza  Routine labs will be collected today: CBC w/ diff, Comprehensive metabolic panel, phosphorus, lipids, CD4 and HIV RNA PCR.  Vaccines updated: Prevnar 20 and TDaP administered  STD screening not performed as not sexually active.  Will RTC in 6 months           Relevant Orders    Tdap vaccine, age 7 years and older  (BOOSTRIX)    Pneumococcal conjugate vaccine, 20-valent (PREVNAR 20)    CBC and Auto Differential    CD4/8 Panel    HIV RNA, quantitative, PCR    Seasonal allergies    Relevant Medications    levocetirizine (Xyzal) 5 mg tablet    Vitamin D deficiency    Overview   Comment on above: Added by Problem List Migration; 2013-7-27; Moved to Suppressed Nov 29 2013 9:10PM;         Relevant Orders    Vitamin D 25-Hydroxy,Total (for eval of Vitamin D levels)     HTN (hypertension) with goal to be determined    Current Assessment & Plan   He was given instructions on monitoring BP at home at pharmacy. Instructed to call in numbers to assess if medications needed.Will have dietician asses.          Mixed hyperlipidemia    Relevant Orders    Lipid Panel    Health care maintenance    Relevant Orders    Tdap vaccine, age 7 years and older  (BOOSTRIX)    Pneumococcal conjugate vaccine, 20-valent (PREVNAR 20)    Hemoglobin A1c    Colonoscopy Screening; Average Risk Patient     Other Visit Diagnoses         High risk medication use        Relevant Orders    Comprehensive Metabolic Panel    Phosphorus            Mary Grace Moore MD           [1]   Current Outpatient Medications:     atorvastatin (Lipitor) 20 mg tablet, TAKE 1 TABLET BY MOUTH EVERY DAY, Disp: 30 tablet, Rfl: 5    cholecalciferol (Vitamin D-3) 50 MCG (2000 UT) tablet, TAKE 1 TABLET BY MOUTH ONCE DAILY, Disp: 30 tablet, Rfl: 5    citalopram (CeleXA) 40 mg tablet, TAKE ONE TABLET BY MOUTH EVERY DAY, Disp: 30 tablet, Rfl: 5    ibuprofen 400 mg tablet, TAKE 1 TABLET BY MOUTH THREE TIMES A DAY AS NEEDED, Disp: 90 tablet, Rfl: 3    Symtuza 504-562-149-10 mg tablet, Take 1 tablet by mouth once daily. With food, Disp: 30 tablet, Rfl: 5    levocetirizine (Xyzal) 5 mg tablet, Take 1 tablet (5 mg) by mouth once daily., Disp: 90 tablet, Rfl: 1    miscellaneous medical supply misc, Monitor Blood pressure once to twice daily and write down result so can check it at next visit, Disp: 1 each, Rfl: 0

## 2025-05-09 LAB
25(OH)D3 SERPL-MCNC: 27 NG/ML (ref 30–100)
BASOPHILS # BLD AUTO: 0.11 X10*3/UL (ref 0–0.1)
BASOPHILS NFR BLD AUTO: 1.3 %
CD3+CD4+ CELLS # BLD: 0.58 X10E9/L (ref 0.35–2.74)
CD3+CD4+ CELLS # BLD: 585 /MM3 (ref 350–2740)
CD3+CD4+ CELLS NFR BLD: 50 % (ref 29–57)
CD3+CD4+ CELLS/CD3+CD8+ CLL BLD: 1.39 % (ref 1–3.5)
CD3+CD8+ CELLS # BLD: 0.42 X10E9/L (ref 0.08–1.49)
CD3+CD8+ CELLS NFR BLD: 36 % (ref 7–31)
EOSINOPHIL # BLD AUTO: 0.4 X10*3/UL (ref 0–0.7)
EOSINOPHIL NFR BLD AUTO: 4.6 %
ERYTHROCYTE [DISTWIDTH] IN BLOOD BY AUTOMATED COUNT: 14.3 % (ref 11.5–14.5)
EST. AVERAGE GLUCOSE BLD GHB EST-MCNC: 94 MG/DL
HBA1C MFR BLD: 4.9 % (ref ?–5.7)
HCT VFR BLD AUTO: 42.3 % (ref 41–52)
HGB BLD-MCNC: 13.1 G/DL (ref 13.5–17.5)
HIV1 RNA # PLAS NAA DL=20: 97 COPIES/ML (ref ?–20)
HIV1 RNA # PLAS NAA DL=20: DETECTED {COPIES}/ML
HIV1 RNA SPEC NAA+PROBE-LOG#: 1.99 LOG10 CPY/ML
IMM GRANULOCYTES # BLD AUTO: 0.02 X10*3/UL (ref 0–0.7)
IMM GRANULOCYTES NFR BLD AUTO: 0.2 % (ref 0–0.9)
LYMPHOCYTES # BLD AUTO: 1.17 X10*3/UL (ref 1.2–4.8)
LYMPHOCYTES # SPEC AUTO: 1.17 X10*3/UL
LYMPHOCYTES NFR BLD AUTO: 13.4 %
MCH RBC QN AUTO: 29.5 PG (ref 26–34)
MCHC RBC AUTO-ENTMCNC: 31 G/DL (ref 32–36)
MCV RBC AUTO: 95 FL (ref 80–100)
MONOCYTES # BLD AUTO: 0.83 X10*3/UL (ref 0.1–1)
MONOCYTES NFR BLD AUTO: 9.5 %
NEUTROPHILS # BLD AUTO: 6.17 X10*3/UL (ref 1.2–7.7)
NEUTROPHILS NFR BLD AUTO: 71 %
NRBC BLD-RTO: 0 /100 WBCS (ref 0–0)
PLATELET # BLD AUTO: 446 X10*3/UL (ref 150–450)
RBC # BLD AUTO: 4.44 X10*6/UL (ref 4.5–5.9)
WBC # BLD AUTO: 8.7 X10*3/UL (ref 4.4–11.3)

## 2025-05-10 ENCOUNTER — PHARMACY VISIT (OUTPATIENT)
Dept: PHARMACY | Facility: CLINIC | Age: 50
End: 2025-05-10
Payer: MEDICAID

## 2025-09-02 DIAGNOSIS — E78.2 MIXED HYPERLIPIDEMIA: ICD-10-CM

## 2025-09-02 DIAGNOSIS — F32.A DEPRESSION, UNSPECIFIED DEPRESSION TYPE: ICD-10-CM

## 2025-09-02 RX ORDER — ATORVASTATIN CALCIUM 20 MG/1
20 TABLET, FILM COATED ORAL DAILY
Qty: 30 TABLET | Refills: 5 | Status: SHIPPED | OUTPATIENT
Start: 2025-09-02

## 2025-09-02 RX ORDER — CITALOPRAM 40 MG/1
40 TABLET ORAL DAILY
Qty: 30 TABLET | Refills: 5 | Status: SHIPPED | OUTPATIENT
Start: 2025-09-02